# Patient Record
Sex: FEMALE | URBAN - METROPOLITAN AREA
[De-identification: names, ages, dates, MRNs, and addresses within clinical notes are randomized per-mention and may not be internally consistent; named-entity substitution may affect disease eponyms.]

---

## 2018-07-17 ENCOUNTER — OFFICE VISIT (OUTPATIENT)
Dept: URBAN - METROPOLITAN AREA CLINIC 58 | Facility: CLINIC | Age: 83
End: 2018-07-17
Payer: MEDICARE

## 2018-07-17 DIAGNOSIS — H35.349 MACULAR HOLE: Primary | ICD-10-CM

## 2018-07-17 DIAGNOSIS — H40.013 OPEN ANGLE WITH BORDERLINE FINDINGS, LOW RISK, BILATERAL: ICD-10-CM

## 2018-07-17 PROCEDURE — 99204 OFFICE O/P NEW MOD 45 MIN: CPT | Performed by: OPHTHALMOLOGY

## 2018-07-17 PROCEDURE — 92134 CPTRZ OPH DX IMG PST SGM RTA: CPT | Performed by: OPHTHALMOLOGY

## 2018-07-17 ASSESSMENT — INTRAOCULAR PRESSURE
OS: 12
OD: 14

## 2018-07-17 ASSESSMENT — VISUAL ACUITY
OS: 20/25
OD: 20/60

## 2018-07-17 NOTE — IMPRESSION/PLAN
Impression: Macular hole: H35.349. Plan: Order OCT of the macula for further evaluation, done today. OCT shows macular hole OD. Order consult with retinal specialist for further evaluation.

## 2018-07-17 NOTE — IMPRESSION/PLAN
Impression: Open angle with borderline findings, low risk, bilateral: H40.013. Plan: OCT scans done today and shows no thinning of the RNFL. Good IOP OU. Will monitor.